# Patient Record
Sex: MALE | Race: WHITE | NOT HISPANIC OR LATINO | URBAN - METROPOLITAN AREA
[De-identification: names, ages, dates, MRNs, and addresses within clinical notes are randomized per-mention and may not be internally consistent; named-entity substitution may affect disease eponyms.]

---

## 2017-02-23 ENCOUNTER — EMERGENCY (EMERGENCY)
Facility: HOSPITAL | Age: 62
LOS: 1 days | Discharge: HOME | End: 2017-02-23
Attending: EMERGENCY MEDICINE | Admitting: EMERGENCY MEDICINE
Payer: COMMERCIAL

## 2017-02-23 VITALS
TEMPERATURE: 98 F | RESPIRATION RATE: 16 BRPM | HEART RATE: 65 BPM | OXYGEN SATURATION: 97 % | DIASTOLIC BLOOD PRESSURE: 62 MMHG | SYSTOLIC BLOOD PRESSURE: 159 MMHG

## 2017-02-23 VITALS
RESPIRATION RATE: 18 BRPM | WEIGHT: 195.11 LBS | HEIGHT: 72 IN | HEART RATE: 66 BPM | OXYGEN SATURATION: 97 % | DIASTOLIC BLOOD PRESSURE: 66 MMHG | TEMPERATURE: 98 F | SYSTOLIC BLOOD PRESSURE: 173 MMHG

## 2017-02-23 DIAGNOSIS — Z98.890 OTHER SPECIFIED POSTPROCEDURAL STATES: Chronic | ICD-10-CM

## 2017-02-23 DIAGNOSIS — S01.81XA LACERATION WITHOUT FOREIGN BODY OF OTHER PART OF HEAD, INITIAL ENCOUNTER: ICD-10-CM

## 2017-02-23 PROCEDURE — 99283 EMERGENCY DEPT VISIT LOW MDM: CPT

## 2017-02-23 NOTE — ED ADULT TRIAGE NOTE - CHIEF COMPLAINT QUOTE
c/o laceration to chin after shaving at home at 5p. Reports eating steak for dinner and cut split open and hasn't stopped bleeding. take 325 mg asa daily.

## 2017-02-23 NOTE — ED PROVIDER NOTE - MEDICAL DECISION MAKING DETAILS
Local infiltration of lidoacine 1% with epi unsuccessful. Surgicell applied with resolution of bleeding. Bandage and supportive tx instructions. Strict return precautions reviewed with pt in which pt verbalizes understanding and agrees to.

## 2017-02-23 NOTE — ED PROVIDER NOTE - OBJECTIVE STATEMENT
60 y/o M on ASA 325mg PO daily s/p carotid endarterectomy presents c/o persistent bleeding from chin after accidentally cutting his chin with a razor while shaving at 5:00pm this afternoon. Pt states he has been holding pressure without improvement.    Denies dizziness, syncope, facial pain, N/V, paresthesias/numbness or weakness